# Patient Record
Sex: FEMALE | Race: WHITE | URBAN - METROPOLITAN AREA
[De-identification: names, ages, dates, MRNs, and addresses within clinical notes are randomized per-mention and may not be internally consistent; named-entity substitution may affect disease eponyms.]

---

## 2018-01-08 ENCOUNTER — TRANSFERRED RECORDS (OUTPATIENT)
Dept: HEALTH INFORMATION MANAGEMENT | Facility: CLINIC | Age: 48
End: 2018-01-08

## 2018-01-15 ENCOUNTER — TRANSFERRED RECORDS (OUTPATIENT)
Dept: HEALTH INFORMATION MANAGEMENT | Facility: CLINIC | Age: 48
End: 2018-01-15

## 2018-01-24 ENCOUNTER — MEDICAL CORRESPONDENCE (OUTPATIENT)
Dept: HEALTH INFORMATION MANAGEMENT | Facility: CLINIC | Age: 48
End: 2018-01-24

## 2018-01-26 ENCOUNTER — MEDICAL CORRESPONDENCE (OUTPATIENT)
Dept: HEALTH INFORMATION MANAGEMENT | Facility: CLINIC | Age: 48
End: 2018-01-26

## 2018-03-12 ENCOUNTER — TRANSFERRED RECORDS (OUTPATIENT)
Dept: HEALTH INFORMATION MANAGEMENT | Facility: CLINIC | Age: 48
End: 2018-03-12

## 2018-05-20 ENCOUNTER — HEALTH MAINTENANCE LETTER (OUTPATIENT)
Age: 48
End: 2018-05-20

## 2018-05-20 ASSESSMENT — ENCOUNTER SYMPTOMS
SPEECH CHANGE: 0
ARTHRALGIAS: 0
JOINT SWELLING: 0
BACK PAIN: 1
DIFFICULTY URINATING: 0
DISTURBANCES IN COORDINATION: 0
WEIGHT LOSS: 0
NIGHT SWEATS: 1
STIFFNESS: 1
TINGLING: 1
MEMORY LOSS: 1
FATIGUE: 1
INCREASED ENERGY: 0
DYSURIA: 0
MUSCLE WEAKNESS: 0
WEAKNESS: 0
HEMATURIA: 0
HALLUCINATIONS: 0
MYALGIAS: 1
NUMBNESS: 1
PARALYSIS: 0
SKIN CHANGES: 0
TREMORS: 0
POLYDIPSIA: 0
POLYPHAGIA: 0
ALTERED TEMPERATURE REGULATION: 0
WEIGHT GAIN: 0
DECREASED LIBIDO: 0
POOR WOUND HEALING: 0
CHILLS: 0
NAIL CHANGES: 0
FEVER: 0
MUSCLE CRAMPS: 0
LOSS OF CONSCIOUSNESS: 0
FLANK PAIN: 0
HOT FLASHES: 0
DECREASED APPETITE: 0
SEIZURES: 0
NECK PAIN: 1
HEADACHES: 0
DIZZINESS: 0

## 2018-05-23 ENCOUNTER — OFFICE VISIT (OUTPATIENT)
Dept: NEUROSURGERY | Facility: CLINIC | Age: 48
End: 2018-05-23
Payer: COMMERCIAL

## 2018-05-23 VITALS
SYSTOLIC BLOOD PRESSURE: 124 MMHG | HEIGHT: 67 IN | HEART RATE: 69 BPM | BODY MASS INDEX: 23.81 KG/M2 | DIASTOLIC BLOOD PRESSURE: 80 MMHG | WEIGHT: 151.7 LBS

## 2018-05-23 DIAGNOSIS — M47.816 SPONDYLOSIS OF LUMBAR SPINE: Primary | ICD-10-CM

## 2018-05-23 RX ORDER — METRONIDAZOLE 10 MG/G
GEL TOPICAL
COMMUNITY
Start: 2017-01-01

## 2018-05-23 RX ORDER — DOXYCYCLINE HYCLATE 50 MG/1
CAPSULE ORAL
COMMUNITY
Start: 2017-01-01

## 2018-05-23 ASSESSMENT — PAIN SCALES - GENERAL: PAINLEVEL: NO PAIN (0)

## 2018-05-23 NOTE — LETTER
"5/23/2018       RE: Ina Sainz  [address]  [place] [address]     Dear Colleague,    Thank you for referring your patient, Ina Sainz, to the [facility] NEUROSURGERY at [facility]. Please see a copy of my visit note below.      Neurosurgery Clinic Consult  Date of Visit: 5/23/2018    Referring Provider:  Iban Longoria    Dear ,    We were happy to see Ina Sainz, a pleasant 48 year old year old female for right-sided low back pain, and left toe numbness.    HPI: as you recall this pleasant patient had low back pain about 5 years ago after a chiropractic adjustment. At that time she was referred to [facility] for evaluation, and was found to have some L4 5 degenerative change which was recommended to be treated nonoperatively. Since that time she has undergone daily stretching and strengthening exercises, mostly in the form of yoga. And she treats herself with p.r.n. Tylenol She has not had formal physical therapy, injection treatments, steroid treatments. But keeps herself fit and active. She does not use tobacco. She has kept her symptoms under reasonably good control,until the last 6 months or so when she started noticing numbness on the bottom of the left great toe, and somewhat to a lesser extent the bottom of the next 2 toes as well. She saw you again, and a new MRI was ordered.There were some advancing changes and so she was referred to neurosurgery for another opinion.    Currently she describes her pain as bilateral low back, right greater than left. Worst first thing in the morning before she gets out of bed. After she gets up and moves about for about a half an hour, takes couple of Tylenol and stretches it eases up. It flares up again if she is sitting for too long or stays still for too long. Essentially she describes it as  \"stiffening up\". She has no leg pain associated with this. No loss of bowel or bladder control. No weakness. " Her numbness is on the bottom of the left foot, specifically the great toe and the next 2 toes. Mostly the bottom of the great toe. Not associated with weakness or any other symptoms. It is not associated with any particular position, time of day, or any other factors. It is quite densely numb and she describes she can stick a pin in it and not feel it. She is quite concerned the numbness is a result of the degenerative disease in her spine. She does not want to have surgery but does not know where else to go for advice how to treat this.    Current Symptom List:   [facility] -  Patient Entered Questionnaire/Answers 5/20/2018   What number best describes your pain right now:  0 = No pain  to  10 = Worst pain imaginable 4   How would you describe the pain? dull, aching   Which of the following worsen your pain? lying down   Which of the following improve or reduce your pain?  medication   What number best describes your average pain for the past week:  0 = No pain  to  10 = Worst pain imaginable 4   What number best describes your LOWEST pain in past 24 hours:  0 = No pain  to  10 = Worst pain imaginable 4   What number best describes your WORST pain in past 24 hours:  0 = No pain  to  10 = Worst pain imaginable 4   What non-medicine treatments have you already had for your pain? none   Have you tried treating your pain with medication?  Yes   Are you currently taking medications for your pain? No       Current Outpatient Prescriptions:      doxycycline (VIBRAMYCIN) 50 MG capsule, , Disp: , Rfl:      metroNIDAZOLE (METROGEL) 1 % gel, , Disp: , Rfl:     Not on File    History reviewed. No pertinent past medical history.    Past Surgical History:   Procedure Laterality Date     COLONOSCOPY  05/2016    Three polyps discovered - pre-cancerous. Removed. Q 3yr     ENT SURGERY  05/1984    Tonsils removed       Family History   Problem Relation Age of Onset     CANCER Paternal Aunt      DIABETES Maternal Uncle        Social  "History     Social History     Marital status:      Spouse name: N/A     Number of children: N/A     Years of education: N/A     Occupational History     Not on file.     Social History Main Topics     Smoking status: Never Smoker     Smokeless tobacco: Never Used     Alcohol use Yes      Comment: 3-4 drinks weekly     Drug use: No     Sexual activity: Yes     Partners: Male     Birth control/ protection: None     Other Topics Concern     Parent/Sibling W/ Cabg, Mi Or Angioplasty Before 65f 55m? No     Social History Narrative     No narrative on file     Problem list and 13 point review of systems: is reviewed in Epic and is negative with the exception of those symptoms associated with HPI and PMH.      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 Edema along endplates, moderately severe bulging  of the disc anterolaterally,  Markedly posterior bulging. However canal and foramina are adequate  L5-S1 mild posterior bulging of disc, unchanged. Hypertrophic change of the facets noted. Neural canal and foramen, adequate  other levels essentially unremarkable  See the full report in EPIC/Media tab.  I personally reviewed the images with the patient.  Overall I finds the severity of the degenerative changes mild-to-moderate at each level affected and appropriate for age at each level affected. There is no severe change at any level in my opinion.    Exam:  *   Well developed, well nourished " female found seated comfortably in exam room chair.  Is able to sit and rise independently.    Accompanied by her male spouse.    *  Mental Status  A&O X3.  Bright, alert, affable, interactive. Language fluid, fund of knowledge intact. Good historian.  Mood and affect congruent and WNL.  *  Cranial Nerves  II: Able to read printed forms, VF full to gross confrontation.  III: IV, VI:  PERRLA, EOMI, No nystagmus, no ptosis.  V: Sensation intact in bilateral V1, V2, and V3. Jaw clench symmetric.    VII:  Intact to voice bilaterally.  IX:  Pushes tongue against bilateral cheeks.  X:  Palate elevates, uvula midline, phonation intact.  XI: Elevates shoulders, head turn intact.  XII: Tongue midline. No fasciculations.  *  Cervical Spine:  DTR's at Biceps, Triceps, and Brachioradialis 2/4 and symmetric.  Sensation intact.    No Hernandez's. No Phalen's.  No Tinel's. No fasciculations.   Muscle bulk and tone WNL.  Upper Extremity Strength.              RIGHT                LEFT     Deltoid              5/5                   5/5       Biceps              5/5                   5/5        Triceps              5/5                   5/5       Wrist Extensor              5/5                   5/5                     5/5                    5/5       Interossei              5/5                   5/5       EPL              5/5                    5/5       Pinch              5/5                  5/5          *  Lumbar Spine:    DTR's Patellar and Achilles 1/4 and symmetric.   No fasciculations.  Muscle bulk and tone WNL.  No Clonus.  Sensation dulled in the left great toe.    Lower Extremity Strength                   RIGHT                   LEFT     Iliopsoas                    5/5                      5/5       Quad                    5/5                        5/5       Hamstring                      5/5                        5/5         Gastrocs                    5/5                        5/5       Tib. Anterior                      5/5                        5/5       EHL                      5/5                        5/5       Babinski                 Down                                      Down                   *  Structural Exam  Inspection of the spine reveals no scoliosis, exaggerated kyphosis or lordosis. Head is balanced over the pelvis in the coronal and sagittal plane.    Lumbar ROM full.  Able to doff and don socks without difficulty. No spasming, no tenderness, no step offs. No SLR.  No SI tenderness. No trochanteric bursal tenderness. Positive right Fabere's  Feet are warm, pink.  Gait narrow, smooth, no scissoring. No antalgia. Tandem gait intact.    *  Sensory level intact.   *  Ayanna's 5/5 are negative.   (Ayanna's are:   non anatomic tenderness;   pain with simulated exam, trunk rotation or axial load;    positive distraction tests e.g.. SLR;    regional disturbances, non anatomic pain, numbness, weakness;  overreaction to testing.)    ASSESSMENT/PLAN:  1. Spondylosis of lumbar spine      Left SI arthropathy. This is the source of her back pain,  in spite of negative findings on the sacral MRI she has a positive Vicky's test, and a fairly classic history, stiffness in the morning or if she sits or is quiet for too long. Injection therapy may help, but nonoperative care at the hands of physical medicine and rehabilitation (PMR) or sports medicine would be useful. Surgical intervention, particularly neurosurgical intervention, is not indicated.    She also has some lumbar spondylosis on imaging, but not much in the way of back pain, and I don't believe her left foot symptoms are related. The bottom of the left foot dermatome would be S1, and there is no impingement at that level on that side. Surgical intervention is not indicated.   I would recommend she continue with her yoga and stretching and consider establishing with a medical spine practice such as PMR, or other such provider. There are some very good ones in  the [place] area. I don't know the [place] area well. I will leave this referral decision to her primary care provider, who knows the local providers and resources, whereas I do not.    Her spine is, overall fairly benign and fairly appropriate for age, despite the readings submitted.   I have not made a specific return appointment but will be happy to see her again should the need arise. I do not anticipate she will need neurosurgical intervention. Finally, we spent some time discussing that medical spine management has made tremendous advances in the past 2 decades, and is a well-established sub-specialty of both PMR and orthopedics. I believe she will be well served in that sphere.    I answered all of her questions, which indicated good understanding of the situation.  She is willing to move forward with the recommendations. I supplied him/her with business cards and telephone numbers for ease of contact.   It's been a pleasure participating in the care of this nice patient. We thank you for your confidence in the [facility], Department of Neurosurgery.      Best Regards,    Neda Cabral PA-C  [facility]  Department of Neurosurgery  Phone: [phone]  Fax: [phone]      Total time: 60 minutes with more than 30 minutes spent in direct face to face contact reviewing films, providing education, counseling, non-operative therapies,and indications for surgery as well as further follow up.    This note was generated using voice recognition software. While edited for content some inaccurate phrasing may be found.

## 2018-05-23 NOTE — MR AVS SNAPSHOT
"              After Visit Summary   5/23/2018    Ina Sainz    MRN: [id number]           Patient Information     Date Of Birth          1970        Visit Information        Provider Department      5/23/2018 12:30 PM Neda Cabral PA-C [facility] Neurosurgery        Today's Diagnoses     Spondylosis of lumbar spine    -  1       Follow-ups after your visit        Follow-up notes from your care team     Return if symptoms worsen or fail to improve.      Who to contact     Please call your clinic at [phone] to:    Ask questions about your health    Make or cancel appointments    Discuss your medicines    Learn about your test results    Speak to your doctor            Additional Information About Your Visit        [employer] Information     [employer] gives you secure access to your electronic health record. If you see a primary care provider, you can also send messages to your care team and make appointments. If you have questions, please call your primary care clinic.  If you do not have a primary care provider, please call [phone] and they will assist you.      [employer] is an electronic gateway that provides easy, online access to your medical records. With [employer], you can request a clinic appointment, read your test results, renew a prescription or communicate with your care team.     To access your existing account, please contact your [facility] or call [phone] for assistance.        Care EveryWhere ID     This is your Care EveryWhere ID. This could be used by other organizations to access your [place] medical records  [id number]        Your Vitals Were     Pulse Height BMI (Body Mass Index)             69 1.708 m (5' 7.25\") 23.58 kg/m2          Blood Pressure from Last 3 Encounters:   05/23/18 124/80    Weight from Last 3 Encounters:   05/23/18 68.8 kg (151 lb 11.2 oz)              Today, you had the following     No orders found for display       Primary Care " Provider Office Phone # Fax #    Iban Longoria [phone]       [address] [address]        Equal Access to Services     GUANAKOENRIQUE KODY : Hadii aad ku hadkelbyo Sostevenali, waaxda luqadaha, qaybta kaalmada adekarolda, miguelina calles laLatoshagigi ruth. So [facility] [phone].    ATENCIÓN: Si habla español, tiene a stevens disposición servicios gratuitos de asistencia lingüística. Llame al [phone].    We comply with applicable federal civil rights laws and [place] laws. We do not discriminate on the basis of race, color, national origin, age, disability, sex, sexual orientation, or gender identity.            Thank you!     Thank you for choosing [facility]  for your care. Our goal is always to provide you with excellent care. Hearing back from our patients is one way we can continue to improve our services. Please take a few minutes to complete the written survey that you may receive in the mail after your visit with us. Thank you!             Your Updated Medication List - Protect others around you: Learn how to safely use, store and throw away your medicines at www.disposemymeds.org.          This list is accurate as of 5/23/18 11:59 PM.  Always use your most recent med list.                   Brand Name Dispense Instructions for use Diagnosis    doxycycline 50 MG capsule    VIBRAMYCIN          METROGEL 1 % gel   Generic drug:  metroNIDAZOLE

## 2018-05-23 NOTE — PROGRESS NOTES
"  Neurosurgery Clinic Consult  Date of Visit: 5/23/2018    Referring Provider:  Iban Longoria    Dear ,    We were happy to see Ina Sainz, a pleasant 48 year old year old female for right-sided low back pain, and left toe numbness.    HPI: as you recall this pleasant patient had low back pain about 5 years ago after a chiropractic adjustment. At that time she was referred to [facility] for evaluation, and was found to have some L4 5 degenerative change which was recommended to be treated nonoperatively. Since that time she has undergone daily stretching and strengthening exercises, mostly in the form of yoga. And she treats herself with p.r.n. Tylenol She has not had formal physical therapy, injection treatments, steroid treatments. But keeps herself fit and active. She does not use tobacco. She has kept her symptoms under reasonably good control,until the last 6 months or so when she started noticing numbness on the bottom of the left great toe, and somewhat to a lesser extent the bottom of the next 2 toes as well. She saw you again, and a new MRI was ordered.There were some advancing changes and so she was referred to neurosurgery for another opinion.    Currently she describes her pain as bilateral low back, right greater than left. Worst first thing in the morning before she gets out of bed. After she gets up and moves about for about a half an hour, takes couple of Tylenol and stretches it eases up. It flares up again if she is sitting for too long or stays still for too long. Essentially she describes it as  \"stiffening up\". She has no leg pain associated with this. No loss of bowel or bladder control. No weakness. Her numbness is on the bottom of the left foot, specifically the great toe and the next 2 toes. Mostly the bottom of the great toe. Not associated with weakness or any other symptoms. It is not associated with any particular position, time of day, or any other factors. It is " quite densely numb and she describes she can stick a pin in it and not feel it. She is quite concerned the numbness is a result of the degenerative disease in her spine. She does not want to have surgery but does not know where else to go for advice how to treat this.    Current Symptom List:   [facility] -  Patient Entered Questionnaire/Answers 5/20/2018   What number best describes your pain right now:  0 = No pain  to  10 = Worst pain imaginable 4   How would you describe the pain? dull, aching   Which of the following worsen your pain? lying down   Which of the following improve or reduce your pain?  medication   What number best describes your average pain for the past week:  0 = No pain  to  10 = Worst pain imaginable 4   What number best describes your LOWEST pain in past 24 hours:  0 = No pain  to  10 = Worst pain imaginable 4   What number best describes your WORST pain in past 24 hours:  0 = No pain  to  10 = Worst pain imaginable 4   What non-medicine treatments have you already had for your pain? none   Have you tried treating your pain with medication?  Yes   Are you currently taking medications for your pain? No       Current Outpatient Prescriptions:      doxycycline (VIBRAMYCIN) 50 MG capsule, , Disp: , Rfl:      metroNIDAZOLE (METROGEL) 1 % gel, , Disp: , Rfl:     Not on File    History reviewed. No pertinent past medical history.    Past Surgical History:   Procedure Laterality Date     COLONOSCOPY  05/2016    Three polyps discovered - pre-cancerous. Removed. Q 3yr     ENT SURGERY  05/1984    Tonsils removed       Family History   Problem Relation Age of Onset     CANCER Paternal Aunt      DIABETES Maternal Uncle        Social History     Social History     Marital status:      Spouse name: N/A     Number of children: N/A     Years of education: N/A     Occupational History     Not on file.     Social History Main Topics     Smoking status: Never Smoker     Smokeless tobacco: Never Used      "Alcohol use Yes      Comment: 3-4 drinks weekly     Drug use: No     Sexual activity: Yes     Partners: Male     Birth control/ protection: None     Other Topics Concern     Parent/Sibling W/ Cabg, Mi Or Angioplasty Before 65f 55m? No     Social History Narrative     No narrative on file     Problem list and 13 point review of systems: is reviewed in Epic and is negative with the exception of those symptoms associated with HPI and PMH.      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 Edema along endplates, moderately severe bulging  of the disc anterolaterally,  Markedly posterior bulging. However canal and foramina are adequate  L5-S1 mild posterior bulging of disc, unchanged. Hypertrophic change of the facets noted. Neural canal and foramen, adequate  other levels essentially unremarkable  See the full report in EPIC/Media tab.  I personally reviewed the images with the patient.  Overall I finds the severity of the degenerative changes mild-to-moderate at each level affected and appropriate for age at each level affected. There is no severe change at any level in my opinion.    Exam:  *   Well developed, well nourished female found seated comfortably in exam room chair.  Is able to sit and rise independently.    Accompanied by her male spouse.    *  Mental Status  A&O X3.  Bright, alert, affable, interactive. Language fluid, fund of knowledge intact. Good historian.  Mood and affect " congruent and WNL.  *  Cranial Nerves  II: Able to read printed forms, VF full to gross confrontation.  III: IV, VI:  PERRLA, EOMI, No nystagmus, no ptosis.  V: Sensation intact in bilateral V1, V2, and V3. Jaw clench symmetric.    VII:  Intact to voice bilaterally.  IX:  Pushes tongue against bilateral cheeks.  X:  Palate elevates, uvula midline, phonation intact.  XI: Elevates shoulders, head turn intact.  XII: Tongue midline. No fasciculations.  *  Cervical Spine:  DTR's at Biceps, Triceps, and Brachioradialis 2/4 and symmetric.  Sensation intact.    No Hernandez's. No Phalen's.  No Tinel's. No fasciculations.   Muscle bulk and tone WNL.  Upper Extremity Strength.              RIGHT                LEFT     Deltoid              5/5                   5/5       Biceps              5/5                   5/5        Triceps              5/5                   5/5       Wrist Extensor              5/5                   5/5                     5/5                    5/5       Interossei              5/5                   5/5       EPL              5/5                    5/5       Pinch              5/5                  5/5          *  Lumbar Spine:    DTR's Patellar and Achilles 1/4 and symmetric.   No fasciculations.  Muscle bulk and tone WNL.  No Clonus.  Sensation dulled in the left great toe.    Lower Extremity Strength                   RIGHT                   LEFT     Iliopsoas                    5/5                      5/5       Quad                    5/5                        5/5       Hamstring                      5/5                        5/5         Gastrocs                    5/5                        5/5       Tib. Anterior                     5/5                        5/5       EHL                      5/5                        5/5       Babinski                 Down                                      Down                   *  Structural Exam  Inspection of the spine reveals no scoliosis,  exaggerated kyphosis or lordosis. Head is balanced over the pelvis in the coronal and sagittal plane.    Lumbar ROM full.  Able to doff and don socks without difficulty. No spasming, no tenderness, no step offs. No SLR.  No SI tenderness. No trochanteric bursal tenderness. Positive right Fabere's  Feet are warm, pink.  Gait narrow, smooth, no scissoring. No antalgia. Tandem gait intact.    *  Sensory level intact.   *  Ayanna's 5/5 are negative.   (Ayanna's are:   non anatomic tenderness;   pain with simulated exam, trunk rotation or axial load;    positive distraction tests e.g.. SLR;    regional disturbances, non anatomic pain, numbness, weakness;  overreaction to testing.)    ASSESSMENT/PLAN:  1. Spondylosis of lumbar spine      Left SI arthropathy. This is the source of her back pain,  in spite of negative findings on the sacral MRI she has a positive Vicky's test, and a fairly classic history, stiffness in the morning or if she sits or is quiet for too long. Injection therapy may help, but nonoperative care at the hands of physical medicine and rehabilitation (PMR) or sports medicine would be useful. Surgical intervention, particularly neurosurgical intervention, is not indicated.    She also has some lumbar spondylosis on imaging, but not much in the way of back pain, and I don't believe her left foot symptoms are related. The bottom of the left foot dermatome would be S1, and there is no impingement at that level on that side. Surgical intervention is not indicated.   I would recommend she continue with her yoga and stretching and consider establishing with a medical spine practice such as PMR, or other such provider. There are some very good ones in the [place] area. I don't know the [facility] well. I will leave this referral decision to her primary care provider, who knows the local providers and resources, whereas I do not.    Her spine is, overall fairly benign and fairly appropriate for age,  despite the readings submitted.   I have not made a specific return appointment but will be happy to see her again should the need arise. I do not anticipate she will need neurosurgical intervention. Finally, we spent some time discussing that medical spine management has made tremendous advances in the past 2 decades, and is a well-established sub-specialty of both PMR and orthopedics. I believe she will be well served in that sphere.    I answered all of her questions, which indicated good understanding of the situation.  She is willing to move forward with the recommendations. I supplied him/her with business cards and telephone numbers for ease of contact.   It's been a pleasure participating in the care of this nice patient. We thank you for your confidence in the [facility], Department of Neurosurgery.      Best Regards,    Neda Cabral PA-C  [facility]  Department of Neurosurgery  Phone: [phone]  Fax: [phone]      Total time: 60 minutes with more than 30 minutes spent in direct face to face contact reviewing films, providing education, counseling, non-operative therapies,and indications for surgery as well as further follow up.    This note was generated using voice recognition software. While edited for content some inaccurate phrasing may be found.

## 2019-11-06 ENCOUNTER — HEALTH MAINTENANCE LETTER (OUTPATIENT)
Age: 49
End: 2019-11-06

## 2020-11-29 ENCOUNTER — HEALTH MAINTENANCE LETTER (OUTPATIENT)
Age: 50
End: 2020-11-29

## 2021-02-14 ENCOUNTER — HEALTH MAINTENANCE LETTER (OUTPATIENT)
Age: 51
End: 2021-02-14

## 2021-05-27 ENCOUNTER — RECORDS - HEALTHEAST (OUTPATIENT)
Dept: ADMINISTRATIVE | Facility: CLINIC | Age: 51
End: 2021-05-27

## 2021-09-19 ENCOUNTER — HEALTH MAINTENANCE LETTER (OUTPATIENT)
Age: 51
End: 2021-09-19

## 2022-01-09 ENCOUNTER — HEALTH MAINTENANCE LETTER (OUTPATIENT)
Age: 52
End: 2022-01-09

## 2022-03-06 ENCOUNTER — HEALTH MAINTENANCE LETTER (OUTPATIENT)
Age: 52
End: 2022-03-06

## 2022-11-21 ENCOUNTER — HEALTH MAINTENANCE LETTER (OUTPATIENT)
Age: 52
End: 2022-11-21

## 2023-04-16 ENCOUNTER — HEALTH MAINTENANCE LETTER (OUTPATIENT)
Age: 53
End: 2023-04-16